# Patient Record
Sex: MALE | Race: WHITE | NOT HISPANIC OR LATINO | Employment: OTHER | URBAN - METROPOLITAN AREA
[De-identification: names, ages, dates, MRNs, and addresses within clinical notes are randomized per-mention and may not be internally consistent; named-entity substitution may affect disease eponyms.]

---

## 2023-09-30 PROBLEM — L90.5 SCAR CONDITION AND FIBROSIS OF SKIN: Status: ACTIVE | Noted: 2023-01-20

## 2023-09-30 PROBLEM — L24.89 IRRITANT CONTACT DERMATITIS DUE TO OTHER AGENTS: Status: ACTIVE | Noted: 2023-01-20

## 2023-09-30 PROBLEM — H91.93 BILATERAL HEARING LOSS: Status: ACTIVE | Noted: 2023-09-30

## 2023-09-30 PROBLEM — L57.0 ACTINIC KERATOSIS: Status: ACTIVE | Noted: 2023-01-20

## 2023-09-30 PROBLEM — L82.0 INFLAMED SEBORRHEIC KERATOSIS: Status: ACTIVE | Noted: 2023-01-20

## 2023-09-30 PROBLEM — H91.8X3 ASYMMETRICAL HEARING LOSS: Status: ACTIVE | Noted: 2023-09-30

## 2023-09-30 PROBLEM — C44.519 BASAL CELL CARCINOMA OF SKIN OF OTHER PART OF TRUNK: Status: ACTIVE | Noted: 2023-01-20

## 2023-09-30 PROBLEM — R42 DIZZINESS: Status: ACTIVE | Noted: 2023-09-30

## 2023-09-30 PROBLEM — L56.8 OTHER SPECIFIED ACUTE SKIN CHANGES DUE TO ULTRAVIOLET RADIATION: Status: ACTIVE | Noted: 2023-01-20

## 2023-09-30 PROBLEM — L56.8 ACTINIC CHEILITIS: Status: ACTIVE | Noted: 2023-01-20

## 2023-09-30 PROBLEM — C44.529 SQUAMOUS CELL CARCINOMA OF SKIN OF OTHER PART OF TRUNK: Status: ACTIVE | Noted: 2023-01-20

## 2023-09-30 PROBLEM — R04.0 EPISTAXIS: Status: ACTIVE | Noted: 2023-09-30

## 2023-09-30 PROBLEM — I87.2 CHRONIC VENOUS INSUFFICIENCY: Status: ACTIVE | Noted: 2023-01-20

## 2023-09-30 PROBLEM — C44.722 SQUAMOUS CELL CARCINOMA OF SKIN OF RIGHT LOWER LIMB, INCLUDING HIP: Status: ACTIVE | Noted: 2023-01-20

## 2023-09-30 PROBLEM — H90.A21 SENSORINEURAL HEARING LOSS (SNHL) OF RIGHT EAR WITH RESTRICTED HEARING OF LEFT EAR: Status: ACTIVE | Noted: 2023-09-30

## 2023-09-30 PROBLEM — C44.521 SQUAMOUS CELL CARCINOMA OF SKIN OF BREAST: Status: ACTIVE | Noted: 2023-01-20

## 2023-09-30 RX ORDER — FLUOROURACIL 20 MG/ML
1 SOLUTION TOPICAL
COMMUNITY
Start: 2018-05-21

## 2023-09-30 RX ORDER — BETAMETHASONE VALERATE 1 MG/G
1 CREAM TOPICAL
COMMUNITY
Start: 2019-06-13

## 2023-09-30 RX ORDER — DESOXIMETASONE 0.5 MG/G
1 OINTMENT TOPICAL
COMMUNITY
Start: 2019-05-06

## 2023-09-30 RX ORDER — VALACYCLOVIR HYDROCHLORIDE 500 MG/1
500 TABLET, FILM COATED ORAL
COMMUNITY
Start: 2021-12-13

## 2023-09-30 RX ORDER — CLOBETASOL PROPIONATE 0.5 MG/G
1 CREAM TOPICAL
COMMUNITY
Start: 2019-06-11

## 2023-09-30 RX ORDER — FLUOROURACIL 50 MG/ML
1 SOLUTION TOPICAL
COMMUNITY
Start: 2022-05-13

## 2023-10-05 ENCOUNTER — APPOINTMENT (OUTPATIENT)
Dept: DERMATOLOGY | Facility: CLINIC | Age: 73
End: 2023-10-05
Payer: MEDICARE

## 2023-10-09 NOTE — PROGRESS NOTES
Subjective   HPI: Mike Allen is a 73 y.o. male is here for follow up on actinic cheilitis. He was started on 5FU topical solution once daily. Patient states he has not had any bleeding or scabbing occurring. No concerns or questions at this time.    ROS: No other skin or systemic complaints other than what is documented elsewhere in the note.    Skin Cancer History  No skin cancer on file.    ALLERGIES: Patient has no known allergies.    SOCIAL:  has no history on file for tobacco use, alcohol use, and drug use.    Objective   Lips  Persistent white plaque with “sandpapery” feel on the lips        Assessment/Plan   1. Actinic cheilitis  Lips    Continue using 5FU topical solution for another 3 days or until bleeding and scabbing has occurred, whichever occurs first.         FOLLOW UP: PRN    The patient was encouraged to contact me with any further questions or concerns.  Julieta Segura PA-C  10/10/2023

## 2023-10-10 ENCOUNTER — OFFICE VISIT (OUTPATIENT)
Dept: DERMATOLOGY | Facility: CLINIC | Age: 73
End: 2023-10-10
Payer: MEDICARE

## 2023-10-10 DIAGNOSIS — L56.8 ACTINIC CHEILITIS: Primary | ICD-10-CM

## 2023-10-10 PROCEDURE — 99212 OFFICE O/P EST SF 10 MIN: CPT

## 2024-01-12 ENCOUNTER — OFFICE VISIT (OUTPATIENT)
Dept: DERMATOLOGY | Facility: CLINIC | Age: 74
End: 2024-01-12
Payer: MEDICARE

## 2024-01-12 DIAGNOSIS — L56.8 ACTINIC CHEILITIS: ICD-10-CM

## 2024-01-12 PROCEDURE — 96372 THER/PROPH/DIAG INJ SC/IM: CPT | Performed by: SPECIALIST

## 2024-01-12 PROCEDURE — 1159F MED LIST DOCD IN RCRD: CPT | Performed by: SPECIALIST

## 2024-01-12 PROCEDURE — 99213 OFFICE O/P EST LOW 20 MIN: CPT | Performed by: SPECIALIST

## 2024-01-12 RX ORDER — TRIAMCINOLONE ACETONIDE 40 MG/ML
60 INJECTION, SUSPENSION INTRA-ARTICULAR; INTRAMUSCULAR ONCE
Status: COMPLETED | OUTPATIENT
Start: 2024-01-12 | End: 2024-01-12

## 2024-01-12 RX ORDER — MUPIROCIN 20 MG/G
OINTMENT TOPICAL
Qty: 22 G | Refills: 0 | Status: SHIPPED | OUTPATIENT
Start: 2024-01-12

## 2024-01-12 RX ORDER — BETAMETHASONE VALERATE 1.2 MG/G
OINTMENT TOPICAL DAILY
Qty: 15 G | Refills: 0 | Status: SHIPPED | OUTPATIENT
Start: 2024-01-12

## 2024-01-12 RX ADMIN — TRIAMCINOLONE ACETONIDE 60 MG: 40 INJECTION, SUSPENSION INTRA-ARTICULAR; INTRAMUSCULAR at 10:54

## 2024-01-12 NOTE — PROGRESS NOTES
Subjective     Mike Allen is a 73 y.o. male who presents for the following: Follow-up (Actinic Cheilitis of bottom lip - Pt on Fluorouracil topical solution).     Review of Systems:  No other skin or systemic complaints other than what is documented elsewhere in the note.    The following portions of the chart were reviewed this encounter and updated as appropriate:          Skin Cancer History  No skin cancer on file.      Specialty Problems          Dermatology Problems    Actinic cheilitis    Actinic keratosis    Basal cell carcinoma of skin of other part of trunk    Inflamed seborrheic keratosis    Irritant contact dermatitis due to other agents    Other specified acute skin changes due to ultraviolet radiation    Scar condition and fibrosis of skin    Squamous cell carcinoma of skin of other part of trunk    Squamous cell carcinoma of skin of right lower limb, including hip        Objective   Well appearing patient in no apparent distress; mood and affect are within normal limits.    A focused skin examination was performed. All findings within normal limits unless otherwise noted below.    Assessment/Plan   1. Actinic cheilitis  Mid Lower [place] Lip    Related Medications  triamcinolone acetonide (Kenalog-40) injection 60 mg

## 2024-01-12 NOTE — PROGRESS NOTES
Subjective   HPI: Mike Allen is a 73 y.o. male is here for evaluation and treatment of     ROS: No other skin or systemic complaints other than what is documented elsewhere in the note.    ALLERGIES: Patient has no known allergies.    SOCIAL:  has no history on file for tobacco use, alcohol use, and drug use.    Objective     Description: Patient states he has been using 2% topical 5-FU to his lower lip for 2 weeks and his lip is inflamed bleeding and will not heal.  Examination reveals significant inflammation and ulceration of actinically damaged skin.  I discussed discontinuing topical 5-FU and treating for the inflammation and patient was agreeable.    Assessment/Plan   1. Actinic cheilitis  Mid Lower [place] Lip    Related Medications  triamcinolone acetonide (Kenalog-40) injection 60 mg      betamethasone valerate (Valisone) 0.1 % ointment  Apply topically once daily. Short term use only    mupirocin (Bactroban) 2 % ointment  Apply a thin layer to the affected area three times a day x 1 week           FOLLOW UP:2 weeks.    The patient was encouraged to contact me with any further questions or concerns.  Zen Vivas MD  1/12/2024

## 2024-04-08 ENCOUNTER — OFFICE VISIT (OUTPATIENT)
Dept: DERMATOLOGY | Facility: CLINIC | Age: 74
End: 2024-04-08
Payer: MEDICARE

## 2024-04-08 DIAGNOSIS — L82.0 SEBORRHEIC KERATOSIS, INFLAMED: ICD-10-CM

## 2024-04-08 PROCEDURE — 1159F MED LIST DOCD IN RCRD: CPT | Performed by: SPECIALIST

## 2024-04-08 PROCEDURE — 11306 SHAVE SKIN LESION 0.6-1.0 CM: CPT | Performed by: SPECIALIST

## 2024-04-08 PROCEDURE — 11305 SHAVE SKIN LESION 0.5 CM/<: CPT | Performed by: SPECIALIST

## 2024-04-08 PROCEDURE — 88305 TISSUE EXAM BY PATHOLOGIST: CPT | Performed by: DERMATOLOGY

## 2024-04-08 NOTE — PROGRESS NOTES
Subjective   HPI: Mike Allen is a 74 y.o. male is here for evaluation and treatment of changing spots that became sore in [place].    ROS: No other skin or systemic complaints other than what is documented elsewhere in the note.    ALLERGIES: Patient has no known allergies.    SOCIAL:  has no history on file for tobacco use, alcohol use, and drug use.    Objective     Description: Patient has a large irritated inflamed plaque photodamaged area right lateral thigh.  Patient also has 2 small inflamed plaques left medial thigh and a photodamaged area which are sensitive.  Diagnostic considerations would include irritated seborrheic keratoses and squamous cell carcinomas.    Assessment/Plan   1. Seborrheic keratosis, inflamed (3)  Left lower Popliteal Fossa; Left upper Popliteal Fossa; Right lateral thigh    Skin biopsy - Right lateral thigh    Specimen 1 - Dermatopathology- DERM LAB  Differential Diagnosis: ISK   Check Margins Yes/No?:    Comments:    Dermpath Lab: Routine Histopathology (formalin-fixed tissue)    Specimen 2 - Dermatopathology- DERM LAB  Differential Diagnosis: ISK   Check Margins Yes/No?:    Comments:    Dermpath Lab: Routine Histopathology (formalin-fixed tissue)    Specimen 3 - Dermatopathology- DERM LAB  Differential Diagnosis: ISK   Check Margins Yes/No?:    Comments:    Dermpath Lab: Routine Histopathology (formalin-fixed tissue)       Plan: Local anesthetic shave excision of all 3 lesions.    FOLLOW UP: Patient can biopsy results.    The patient was encouraged to contact me with any further questions or concerns.  Zen Vivas MD  4/8/2024

## 2024-04-10 LAB
LABORATORY COMMENT REPORT: NORMAL
PATH REPORT.FINAL DX SPEC: NORMAL
PATH REPORT.GROSS SPEC: NORMAL
PATH REPORT.MICROSCOPIC SPEC OTHER STN: NORMAL
PATH REPORT.RELEVANT HX SPEC: NORMAL
PATH REPORT.TOTAL CANCER: NORMAL

## 2024-08-26 ENCOUNTER — APPOINTMENT (OUTPATIENT)
Dept: DERMATOLOGY | Facility: CLINIC | Age: 74
End: 2024-08-26
Payer: MEDICARE

## 2024-08-26 DIAGNOSIS — L57.0 ACTINIC KERATOSIS: ICD-10-CM

## 2024-08-26 DIAGNOSIS — D48.5 NEOPLASM OF UNCERTAIN BEHAVIOR OF SKIN: Primary | ICD-10-CM

## 2024-08-26 DIAGNOSIS — L30.4 INTERTRIGO: ICD-10-CM

## 2024-08-26 DIAGNOSIS — Z85.828 HISTORY OF NONMELANOMA SKIN CANCER: ICD-10-CM

## 2024-08-26 DIAGNOSIS — L57.8 DIFFUSE PHOTODAMAGE OF SKIN: ICD-10-CM

## 2024-08-26 DIAGNOSIS — L82.1 SEBORRHEIC KERATOSIS: ICD-10-CM

## 2024-08-26 DIAGNOSIS — D18.01 HEMANGIOMA OF SKIN: ICD-10-CM

## 2024-08-26 DIAGNOSIS — D22.5 MELANOCYTIC NEVUS OF TRUNK: ICD-10-CM

## 2024-08-26 RX ORDER — OLMESARTAN MEDOXOMIL 40 MG/1
40 TABLET ORAL DAILY
COMMUNITY

## 2024-08-26 RX ORDER — UBIDECARENONE 30 MG
30 CAPSULE ORAL 3 TIMES DAILY
COMMUNITY

## 2024-08-26 RX ORDER — TADALAFIL 5 MG/1
5 TABLET ORAL
COMMUNITY
Start: 2024-06-13

## 2024-08-26 RX ORDER — AMLODIPINE BESYLATE 10 MG/1
10 TABLET ORAL DAILY
COMMUNITY

## 2024-08-26 RX ORDER — OMEPRAZOLE 20 MG/1
20 CAPSULE, DELAYED RELEASE ORAL
COMMUNITY
Start: 2017-01-30 | End: 2025-08-20

## 2024-08-26 RX ORDER — HYDROCHLOROTHIAZIDE 25 MG/1
25 TABLET ORAL DAILY
COMMUNITY

## 2024-08-26 RX ORDER — DUTASTERIDE 0.5 MG/1
0.5 CAPSULE, LIQUID FILLED ORAL DAILY
COMMUNITY

## 2024-08-26 RX ORDER — ACETAMINOPHEN 500 MG
1 TABLET ORAL
COMMUNITY

## 2024-08-26 RX ORDER — FOLIC ACID 1 MG/1
1 TABLET ORAL
COMMUNITY

## 2024-08-26 RX ORDER — KETOCONAZOLE 20 MG/G
CREAM TOPICAL 2 TIMES DAILY
Qty: 60 G | Refills: 11 | Status: SHIPPED | OUTPATIENT
Start: 2024-08-26

## 2024-08-26 RX ORDER — LISINOPRIL 40 MG/1
40 TABLET ORAL DAILY
COMMUNITY

## 2024-08-26 RX ORDER — METFORMIN HYDROCHLORIDE 500 MG/1
1000 TABLET, EXTENDED RELEASE ORAL
COMMUNITY

## 2024-08-26 RX ORDER — ROSUVASTATIN CALCIUM 20 MG/1
20 TABLET, COATED ORAL DAILY
COMMUNITY

## 2024-08-26 RX ORDER — NAPROXEN SODIUM 220 MG/1
TABLET, FILM COATED ORAL
COMMUNITY

## 2024-08-26 RX ORDER — AZELASTINE 1 MG/ML
SPRAY, METERED NASAL
COMMUNITY

## 2024-08-26 RX ORDER — FLUTICASONE FUROATE AND VILANTEROL 100; 25 UG/1; UG/1
1 POWDER RESPIRATORY (INHALATION) DAILY
COMMUNITY

## 2024-08-26 ASSESSMENT — DERMATOLOGY PATIENT ASSESSMENT
DO YOU USE SUNSCREEN: OCCASIONALLY
DO YOU HAVE ANY NEW OR CHANGING LESIONS: YES
DO YOU USE A TANNING BED: NO

## 2024-08-26 ASSESSMENT — DERMATOLOGY QUALITY OF LIFE (QOL) ASSESSMENT: ARE THERE EXCLUSIONS OR EXCEPTIONS FOR THE QUALITY OF LIFE ASSESSMENT: NO

## 2024-08-26 NOTE — PROGRESS NOTES
"Subjective     Mike Allen is a 74 y.o. male who presents for the following: Skin Exam.  He notes 2 raised, scaly bumps on his right thigh and right forearm, which have been present for a few months, have increased in size, and sometimes itch and hurt.  They have not changed in any other way, and they do not bleed.  He also notes red, irritated patches in his groin folds.  He denies any other new, changing, or concerning skin lesions; no bleeding, itching, or burning lesions.      Review of Systems:  No other skin or systemic complaints other than what is documented elsewhere in the note.    The following portions of the chart were reviewed this encounter and updated as appropriate:       Skin Cancer History  No skin cancer on file.    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 Vivas on 12/9/21 s/p ED&C by Dr. Vivas on 4/26/22  - Aks  - biopsy-proven actinic cheilitis  - no h/o melanoma    Family History:    No family history of melanoma or skin cancer    Social History:    The patient states he works in the [employer] industry, mainly in [employer], and pronounces his last name \"E-jose ramon\"    Allergies:  Gluten and Mold    Current Medications / CAM's:    Current Outpatient Medications:     omeprazole (PriLOSEC) 20 mg DR capsule, Take 1 capsule (20 mg) by mouth once daily., Disp: , Rfl:     tadalafil (Cialis) 5 mg tablet, Take 1 tablet (5 mg) by mouth once daily., Disp: , Rfl:     " amLODIPine (Norvasc) 10 mg tablet, Take 1 tablet (10 mg) by mouth once daily., Disp: , Rfl:     aspirin 81 mg chewable tablet, , Disp: , Rfl:     azelastine (Astelin) 137 mcg (0.1 %) nasal spray, SPRAY 2 SPRAYS BY INTRANASAL ROUTE TWICE A DAY, Disp: , Rfl:     betamethasone valerate (Valisone) 0.1 % cream, 1 Application., Disp: , Rfl:     betamethasone valerate (Valisone) 0.1 % ointment, Apply topically once daily. Short term use only, Disp: 15 g, Rfl: 0    cholecalciferol (Vitamin D-3) 50 mcg (2,000 unit) capsule, Take 1 capsule (50 mcg) by mouth once daily., Disp: , Rfl:     clobetasol (Temovate) 0.05 % cream, 1 Application., Disp: , Rfl:     co-enzyme Q-10 30 mg capsule, Take 1 capsule (30 mg) by mouth 3 times a day., Disp: , Rfl:     desoximetasone 0.05 % ointment, 1 Application., Disp: , Rfl:     DOCOSAHEXAENOIC ACID ORAL, Take by mouth., Disp: , Rfl:     dutasteride (Avodart) 0.5 mg capsule, Take 1 capsule (0.5 mg) by mouth once daily., Disp: , Rfl:     fluorouracil (Efudex) 2 % solution, 1 Application., Disp: , Rfl:     fluorouracil (Efudex) 5 % solution, 1 Application., Disp: , Rfl:     fluticasone furoate-vilanteroL (Breo Ellipta) 100-25 mcg/dose inhaler, Inhale 1 puff once daily., Disp: , Rfl:     folic acid (Folvite) 1 mg tablet, Take 1 tablet (1 mg) by mouth once daily., Disp: , Rfl:     hydroCHLOROthiazide (HYDRODiuril) 25 mg tablet, Take 1 tablet (25 mg) by mouth once daily., Disp: , Rfl:     ketoconazole (NIZOral) 2 % cream, Apply topically 2 times a day., Disp: 60 g, Rfl: 11    lisinopril 40 mg tablet, Take 1 tablet (40 mg) by mouth once daily., Disp: , Rfl:     metFORMIN  mg 24 hr tablet, Take 2 tablets (1,000 mg) by mouth once daily with breakfast., Disp: , Rfl:     mupirocin (Bactroban) 2 % ointment, Apply a thin layer to the affected area three times a day x 1 week, Disp: 22 g, Rfl: 0    olmesartan (BENIcar) 40 mg tablet, Take 1 tablet (40 mg) by mouth once daily., Disp: , Rfl:      rosuvastatin (Crestor) 20 mg tablet, Take 1 tablet (20 mg) by mouth once daily., Disp: , Rfl:     valACYclovir (Valtrex) 500 mg tablet, 1 tablet (500 mg)., Disp: , Rfl:      Objective   Well appearing patient in no apparent distress; mood and affect are within normal limits.    A full examination was performed including scalp, face, eyes, ears, nose, lips, neck, chest, axillae, abdomen, back, bilateral upper extremities, and bilateral lower extremities. All findings within normal limits unless otherwise noted below.    Assessment/Plan   1. Neoplasm of uncertain behavior of skin (4)  Right Medial Distal Thigh  8 mm erythematous, scaly papule           Shave removal    Lesion length (cm):  0.8  Lesion width (cm):  0.8  Margin per side (cm):  0  Lesion diameter (cm):  0.8  Informed consent: discussed and consent obtained    Timeout: patient name, date of birth, surgical site, and procedure verified    Procedure prep:  Patient was prepped and draped  Anesthesia: the lesion was anesthetized in a standard fashion    Anesthetic:  1% lidocaine w/ epinephrine 1-100,000 local infiltration  Instrument used: flexible razor blade    Hemostasis achieved with: aluminum chloride    Outcome: patient tolerated procedure well    Post-procedure details: sterile dressing applied and wound care instructions given    Dressing type: bandage and petrolatum      Staff Communication: Dermatology Local Anesthesia: 1 % Lidocaine / Epinephrine - Amount:1cc    Specimen 1 - Dermatopathology- DERM LAB  Differential Diagnosis: HAK vs. SK vs. SCC  Check Margins Yes/No?:    Comments:    Dermpath Lab: Routine Histopathology (formalin-fixed tissue)    Right Medial Proximal Forearm  7 mm erythematous, scaly papule           Shave removal    Lesion length (cm):  0.7  Lesion width (cm):  0.7  Margin per side (cm):  0  Lesion diameter (cm):  0.7  Informed consent: discussed and consent obtained    Timeout: patient name, date of birth, surgical site, and  procedure verified    Procedure prep:  Patient was prepped and draped  Anesthesia: the lesion was anesthetized in a standard fashion    Anesthetic:  1% lidocaine w/ epinephrine 1-100,000 local infiltration  Instrument used: flexible razor blade    Hemostasis achieved with: aluminum chloride    Outcome: patient tolerated procedure well    Post-procedure details: sterile dressing applied and wound care instructions given    Dressing type: bandage and petrolatum      Staff Communication: Dermatology Local Anesthesia: 1 % Lidocaine / Epinephrine - Amount:1cc    Specimen 2 - Dermatopathology- DERM LAB  Differential Diagnosis: HAK vs. SCC  Check Margins Yes/No?:    Comments:    Dermpath Lab: Routine Histopathology (formalin-fixed tissue)    Left Lateral Mid Arm  1.2 cm dark brown pigmented, asymmetric patch with an asymmetric pigment network and irregular borders           Shave removal    Lesion length (cm):  1.2  Lesion width (cm):  1.2  Margin per side (cm):  0  Lesion diameter (cm):  1.2  Informed consent: discussed and consent obtained    Timeout: patient name, date of birth, surgical site, and procedure verified    Procedure prep:  Patient was prepped and draped  Anesthesia: the lesion was anesthetized in a standard fashion    Anesthetic:  1% lidocaine w/ epinephrine 1-100,000 local infiltration  Instrument used: flexible razor blade    Hemostasis achieved with: aluminum chloride    Outcome: patient tolerated procedure well    Post-procedure details: sterile dressing applied and wound care instructions given    Dressing type: bandage and petrolatum      Staff Communication: Dermatology Local Anesthesia: 1 % Lidocaine / Epinephrine - Amount:1cc    Specimen 3 - Dermatopathology- DERM LAB  Differential Diagnosis: AMH vs. LM vs. lentigo  Check Margins Yes/No?:    Comments:    Dermpath Lab: Routine Histopathology (formalin-fixed tissue)    Left Anterior Temple  1 cm erythematous, scaly, tender papule           Lesion  biopsy  Type of biopsy: tangential    Informed consent: discussed and consent obtained    Timeout: patient name, date of birth, surgical site, and procedure verified    Procedure prep:  Patient was prepped and draped  Anesthesia: the lesion was anesthetized in a standard fashion    Anesthetic:  1% lidocaine w/ epinephrine 1-100,000 local infiltration  Instrument used: flexible razor blade    Hemostasis achieved with: aluminum chloride    Outcome: patient tolerated procedure well    Post-procedure details: wound care instructions given      Specimen 4 - Dermatopathology- DERM LAB  Differential Diagnosis: AK vs. SCCIS  Check Margins Yes/No?:    Comments:    Dermpath Lab: Routine Histopathology (formalin-fixed tissue)    2. Actinic keratosis (16)  Head - Anterior (Face) (10), Scalp (6)  Scattered on the patient's face and bilateral ears, there are multiple erythematous, gritty, scaly macules     Actinic Keratoses -scattered on face and bilateral ears.  The pre-cancerous nature of these lesions and treatment options were discussed with the patient today.  At this time, we recommend treatment with liquid nitrogen cryotherapy.  The patient expressed understanding, is in agreement with this plan, and wishes to proceed with cryotherapy today.    Destr of lesion - Head - Anterior (Face) (10), Scalp (6)  Complexity: simple    Destruction method: cryotherapy    Informed consent: discussed and consent obtained    Lesion destroyed using liquid nitrogen: Yes    Cryotherapy cycles:  1  Outcome: patient tolerated procedure well with no complications    Post-procedure details: wound care instructions given      3. Intertrigo  In the patient's bilateral inguinal folds, there are red, moist, slightly scaly patches located in skin folds.    Intertrigo - bilateral inguinal folds.  The potentially chronic and intermittently flaring nature of this condition, which likely involves colonization with Candidal yeast, and treatment options  were discussed extensively with the patient today.  At this time, we recommend topical anti-fungal therapy with Ketoconazole 2% cream, which the patient was instructed to apply twice daily to the affected areas for the next 2-3 weeks, followed by taper to weekends only with use of over-the-counter Zeasorb AF powder once daily for maintenance; the patient may repeat treatment in a similar fashion as needed for future flares.  The risks, benefits, and side effects of these medications were discussed.  The patient expressed understanding and is in agreement with this plan.    ketoconazole (NIZOral) 2 % cream  Apply topically 2 times a day.    4. Melanocytic nevus of trunk  Scattered on the patient's face, neck, trunk, and extremities, there are several small, round- to oval-shaped, brown-pigmented and pink-colored, symmetric, uniform-appearing macules and dome-shaped papules    Clinically benign- to slightly atypical-appearing nevi - the clinically benign- to slightly atypical-appearing nature of the patient's nevi was discussed with the patient today.  None of the patient's nevi meet threshold for biopsy today.  I emphasized the importance of performing monthly self-skin exams using the ABCDs of monitoring moles, which were reviewed with the patient today and an informational hand-out provided.  I also emphasized the importance of sun avoidance and sun protection with daily sunscreen use.  The patient expressed understanding and is in agreement with this plan.    5. Seborrheic keratosis  Scattered on the patient's face, neck, trunk, and extremities, there are multiple tan- to light brown-colored, hyperkeratotic, stuck-on appearing papules of varying size and shape    Seborrheic Keratoses - the benign nature of these lesions was discussed with the patient today and reassurance provided.  No treatment is medically indicated for these lesions at this time.    6. Hemangioma of skin  Scattered on the patient's face, neck,  trunk, and extremities, there are multiple small, round, cherry red- to purplish-colored, symmetric, uniform, vascular-appearing macules and papules    Cherry Angiomas - the benign nature of these vascular lesions was discussed with the patient today and reassurance provided.  No treatment is medically indicated for these lesions at this time.    7. History of nonmelanoma skin cancer  On the patient's left upper scapula, there is a well-healed scar with no evidence of recurrent growth on exam today.    History of squamous cell carcinoma in situ and actinic keratoses and photodamage.  There is no evidence of recurrence on exam today.  The signs and symptoms of skin cancer were reviewed and the patient was advised to practice sun protection and sun avoidance, use daily sunscreen, and perform regular self skin exams.  We will have the patient return to our office in 4 to 6 months, pending the above biopsy results, for routine follow-up and skin exam, and the patient was instructed to call our office should the patient notice any new, changing, symptomatic, or otherwise concerning skin lesions before then.  The patient expressed understanding and is in agreement with this plan.    8. Diffuse photodamage of skin  Photodistributed  Diffuse photodamage with actinic changes with telangiectasia and mottled pigmentation in sun-exposed areas.    Photodamage.  The signs and symptoms of skin cancer were reviewed and the patient was advised to practice sun protection and sun avoidance, use daily sunscreen, and perform regular self skin exams.  Sun protection was discussed, including avoiding the mid-day sun, wearing a sunscreen with SPF at least 50, and stressing the need for reapplication of sunscreen and applying more than they think they need.          Pedro Luis Galvez MD  Department of Dermatology      I saw and evaluated the patient. I personally obtained the key and critical portions of the history and physical exam or was  physically present for key and critical portions performed by the resident/fellow. I reviewed the resident/fellow's documentation and discussed the patient with the resident/fellow. I agree with the resident/fellow's medical decision making as documented in the note.    Garfield Calvin MD

## 2024-09-19 ENCOUNTER — APPOINTMENT (OUTPATIENT)
Dept: DERMATOLOGY | Facility: CLINIC | Age: 74
End: 2024-09-19
Payer: MEDICARE

## 2024-10-01 ENCOUNTER — APPOINTMENT (OUTPATIENT)
Dept: DERMATOLOGY | Facility: CLINIC | Age: 74
End: 2024-10-01
Payer: MEDICARE

## 2024-10-01 DIAGNOSIS — L82.0 INFLAMED SEBORRHEIC KERATOSIS: ICD-10-CM

## 2024-10-01 DIAGNOSIS — L57.8 DIFFUSE PHOTODAMAGE OF SKIN: ICD-10-CM

## 2024-10-01 DIAGNOSIS — L57.0 ACTINIC KERATOSIS: Primary | ICD-10-CM

## 2024-10-01 DIAGNOSIS — L82.1 SEBORRHEIC KERATOSIS: ICD-10-CM

## 2024-10-01 DIAGNOSIS — L81.4 LENTIGO: ICD-10-CM

## 2024-10-01 PROCEDURE — 17110 DESTRUCTION B9 LES UP TO 14: CPT | Performed by: DERMATOLOGY

## 2024-10-01 PROCEDURE — 99213 OFFICE O/P EST LOW 20 MIN: CPT | Performed by: DERMATOLOGY

## 2024-10-01 PROCEDURE — 1159F MED LIST DOCD IN RCRD: CPT | Performed by: DERMATOLOGY

## 2024-10-01 PROCEDURE — 17004 DESTROY PREMAL LESIONS 15/>: CPT | Performed by: DERMATOLOGY

## 2024-10-01 NOTE — PROGRESS NOTES
Subjective     Mike Allen is a 74 y.o. male who presents for the following: Discuss recent biopsy result and treatment options.  Biopsy of a suspicious lesion on his left anterior temple performed at his last visit in our office on 8/26/24 revealed an actinic keratosis.  Of note, biopsy of 3 additional suspicious lesions also performed at that visit revealed verrucous keratoses on his right medial distal thigh and right medial proximal forearm and a lentigo on his left lateral mid arm.    Today, the patient states the biopsy sites healed well.  Since his last visit, he notes a brown, raised bump in his left hairline has been itching.  It has not changed in any other way, including in size, shape, or color, and it does not hurt or bleed.  He denies any other new, changing, or concerning skin lesions since his last visit; no bleeding, itching, or burning lesions.      Review of Systems:  No other skin or systemic complaints other than what is documented elsewhere in the note.    The following portions of the chart were reviewed this encounter and updated as appropriate:       Skin Cancer History  No skin cancer on file.    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 Vivas on 12/9/21 s/p ED&C by Dr. 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 Short term use only, Disp: 15 g, Rfl: 0    cholecalciferol (Vitamin D-3) 50 mcg (2,000 unit) capsule, Take 1 capsule (50 mcg) by mouth once daily., Disp: , Rfl:     clobetasol (Temovate) 0.05 % cream, 1 Application., Disp: , Rfl:     co-enzyme Q-10 30 mg capsule, Take 1 capsule (30 mg) by mouth 3 times a day., Disp: , Rfl:     desoximetasone 0.05 % ointment, 1 Application., Disp: , Rfl:     DOCOSAHEXAENOIC ACID ORAL, Take by mouth., Disp: , Rfl:     dutasteride (Avodart) 0.5 mg capsule, Take 1 capsule (0.5 mg) by mouth once daily., Disp: , Rfl:     fluorouracil (Efudex) 2 % solution, 1 Application., Disp: , Rfl:     fluorouracil (Efudex) 5 % solution, 1 Application., Disp: , Rfl:     fluticasone furoate-vilanteroL (Breo Ellipta) 100-25 mcg/dose inhaler, Inhale 1 puff once daily., Disp: , Rfl:     folic acid (Folvite) 1 mg tablet, Take 1 tablet (1 mg) by mouth once daily., Disp: , Rfl:     hydroCHLOROthiazide (HYDRODiuril) 25 mg tablet, Take 1 tablet (25 mg) by mouth once daily., Disp: , Rfl:     ketoconazole (NIZOral) 2 % cream, Apply topically 2 times a day., Disp: 60 g, Rfl: 11    lisinopril 40 mg tablet, Take 1 tablet (40 mg) by mouth once daily., Disp: , Rfl:     metFORMIN  mg 24 hr tablet, Take 2 tablets (1,000 mg) by mouth once daily with breakfast., Disp: , Rfl:     mupirocin " (Bactroban) 2 % ointment, Apply a thin layer to the affected area three times a day x 1 week, Disp: 22 g, Rfl: 0    olmesartan (BENIcar) 40 mg tablet, Take 1 tablet (40 mg) by mouth once daily., Disp: , Rfl:     omeprazole (PriLOSEC) 20 mg DR capsule, Take 1 capsule (20 mg) by mouth once daily., Disp: , Rfl:     rosuvastatin (Crestor) 20 mg tablet, Take 1 tablet (20 mg) by mouth once daily., Disp: , Rfl:     tadalafil (Cialis) 5 mg tablet, Take 1 tablet (5 mg) by mouth once daily., Disp: , Rfl:     valACYclovir (Valtrex) 500 mg tablet, 1 tablet (500 mg)., Disp: , Rfl:      Objective   Well appearing patient in no apparent distress; mood and affect are within normal limits.    A skin examination was performed including: Face, neck, and scalp. All findings within normal limits unless otherwise noted below.    Assessment/Plan   1. Actinic keratosis (17)  Head - Anterior (Face) (17)  On the patient's left anterior temple, there is a pink, well-healed scar at the recent biopsy site with a surrounding rim of erythema, grittiness, and scale; scattered on the patient's face and scalp, there are multiple erythematous, gritty, scaly macules    Biopsy-proven Actinic Keratosis and additional AKs -left anterior temple, present on the deep and peripheral margins, and scattered on face and scalp, respectively.  The pre-cancerous nature of these lesions and treatment options were discussed with the patient today.  At this time, I recommend treatment with liquid nitrogen cryotherapy.  The patient expressed understanding, is in agreement with this plan, and wishes to proceed with cryotherapy today.    Destr of lesion - Head - Anterior (Face) (17)  Complexity: simple    Destruction method: cryotherapy    Informed consent: discussed and consent obtained    Lesion destroyed using liquid nitrogen: Yes    Cryotherapy cycles:  1  Outcome: patient tolerated procedure well with no complications    Post-procedure details: wound care  instructions given      2. Inflamed seborrheic keratosis  Head - Anterior (Face)  On the patient's left temporal hairline, there is a 8 mm erythematous and light brown-colored, hyperkeratotic, stuck-on appearing papule with a surrounding rim of erythema    Inflamed Seborrheic Keratosis -left temporal hairline.  The benign nature of this lesion was discussed with the patient today and reassurance provided.  Given the history the patient provides of frequent irritation and associated symptoms as well as its inflamed appearance on exam today, I offered to treat this lesion with liquid nitrogen cryotherapy.  The patient expressed understanding, is in agreement with this plan, and wishes to proceed with cryotherapy today.    Destr of lesion - Head - Anterior (Face)  Complexity: simple    Destruction method: cryotherapy    Informed consent: discussed and consent obtained    Lesion destroyed using liquid nitrogen: Yes    Cryotherapy cycles:  2  Outcome: patient tolerated procedure well with no complications    Post-procedure details: wound care instructions given      3. Seborrheic keratosis  Scattered on the patient's face, neck, and scalp, there are multiple tan- to light brown-colored, hyperkeratotic, stuck-on appearing papules of varying size and shape    Seborrheic Keratoses - the benign nature of these lesions was discussed with the patient today and reassurance provided.  No treatment is medically indicated for the noninflamed SKs at this time.    4. Lentigo  Photodistributed  Multiple tan- to light brown-colored, round- to oval-shaped, symmetric and uniform-appearing macules and small patches consistent with lentigines scattered in sun-exposed areas.    Solar Lentigines and photodamage.  The clinically benign-appearing nature of these lesions and their relation to chronic sun exposure were discussed with the patient today and reassurance provided.  None of these lesions meet threshold for biopsy today, and thus no  treatment is medically indicated for these lesions at this time.  The signs and symptoms of skin cancer were reviewed and the patient was advised to practice sun protection and sun avoidance, use daily sunscreen, and perform regular self skin exams.  The patient was instructed to monitor these lesions for any changes, such as in size, shape, or color, or associated symptoms and to call our office to schedule a return visit for re-evaluation if any such changes or symptoms are noticed in the future.  The patient expressed understanding and is in agreement with this plan.    5. Diffuse photodamage of skin  Photodistributed  Diffuse photodamage with actinic changes with telangiectasia and mottled pigmentation in sun-exposed areas.    History of squamous cell carcinoma in situ and actinic keratoses and photodamage.  The patient was recently seen in our office for routine follow-up and skin exam on 8/26/24, at which time no evidence of recurrence was noted.  The signs and symptoms of skin cancer were reviewed and the patient was advised to practice sun protection and sun avoidance, use daily sunscreen, and perform regular self skin exams.  I will have the patient return to our office in 4 to 6 months from the date of his last visit for routine follow-up and skin exam, and the patient was instructed to call our office should the patient notice any new, changing, symptomatic, or otherwise concerning skin lesions before then.  The patient expressed understanding and is in agreement with this plan.

## 2025-04-09 ENCOUNTER — APPOINTMENT (OUTPATIENT)
Dept: DERMATOLOGY | Facility: CLINIC | Age: 75
End: 2025-04-09
Payer: MEDICARE

## 2025-08-26 ENCOUNTER — APPOINTMENT (OUTPATIENT)
Dept: DERMATOLOGY | Facility: CLINIC | Age: 75
End: 2025-08-26
Payer: MEDICARE

## 2025-08-26 DIAGNOSIS — Z85.828 HISTORY OF NONMELANOMA SKIN CANCER: ICD-10-CM

## 2025-08-26 DIAGNOSIS — L57.0 ACTINIC KERATOSIS: ICD-10-CM

## 2025-08-26 DIAGNOSIS — D48.5 NEOPLASM OF UNCERTAIN BEHAVIOR OF SKIN: Primary | ICD-10-CM

## 2025-08-26 DIAGNOSIS — B35.3 TINEA PEDIS OF LEFT FOOT: ICD-10-CM

## 2025-08-26 DIAGNOSIS — D18.01 HEMANGIOMA OF SKIN: ICD-10-CM

## 2025-08-26 DIAGNOSIS — L82.1 SEBORRHEIC KERATOSIS: ICD-10-CM

## 2025-08-26 DIAGNOSIS — L82.0 INFLAMED SEBORRHEIC KERATOSIS: ICD-10-CM

## 2025-08-26 DIAGNOSIS — L57.8 DIFFUSE PHOTODAMAGE OF SKIN: ICD-10-CM

## 2025-08-26 DIAGNOSIS — B35.3 TINEA PEDIS OF RIGHT FOOT: ICD-10-CM

## 2025-08-26 DIAGNOSIS — D22.5 MELANOCYTIC NEVUS OF TRUNK: ICD-10-CM

## 2025-08-26 PROCEDURE — 17004 DESTROY PREMAL LESIONS 15/>: CPT | Performed by: DERMATOLOGY

## 2025-08-26 PROCEDURE — 17110 DESTRUCTION B9 LES UP TO 14: CPT | Performed by: DERMATOLOGY

## 2025-08-26 PROCEDURE — 99214 OFFICE O/P EST MOD 30 MIN: CPT | Performed by: DERMATOLOGY

## 2025-08-26 PROCEDURE — 11301 SHAVE SKIN LESION 0.6-1.0 CM: CPT | Performed by: DERMATOLOGY

## 2025-08-26 PROCEDURE — 1159F MED LIST DOCD IN RCRD: CPT | Performed by: DERMATOLOGY

## 2025-08-26 PROCEDURE — 11302 SHAVE SKIN LESION 1.1-2.0 CM: CPT | Performed by: DERMATOLOGY

## 2025-08-26 PROCEDURE — 11102 TANGNTL BX SKIN SINGLE LES: CPT | Performed by: DERMATOLOGY

## 2025-08-26 RX ORDER — KETOCONAZOLE 20 MG/G
CREAM TOPICAL 2 TIMES DAILY
Qty: 60 G | Refills: 11 | Status: SHIPPED | OUTPATIENT
Start: 2025-08-26

## 2025-10-30 ENCOUNTER — APPOINTMENT (OUTPATIENT)
Dept: DERMATOLOGY | Facility: CLINIC | Age: 75
End: 2025-10-30
Payer: MEDICARE

## 2026-02-25 ENCOUNTER — APPOINTMENT (OUTPATIENT)
Dept: DERMATOLOGY | Facility: CLINIC | Age: 76
End: 2026-02-25
Payer: MEDICARE